# Patient Record
Sex: MALE | Race: ASIAN | Employment: FULL TIME | URBAN - METROPOLITAN AREA
[De-identification: names, ages, dates, MRNs, and addresses within clinical notes are randomized per-mention and may not be internally consistent; named-entity substitution may affect disease eponyms.]

---

## 2020-10-11 ENCOUNTER — IMMUNIZATION (OUTPATIENT)
Dept: NURSING | Facility: CLINIC | Age: 35
End: 2020-10-11
Payer: COMMERCIAL

## 2020-10-11 DIAGNOSIS — Z23 NEED FOR PROPHYLACTIC VACCINATION AND INOCULATION AGAINST INFLUENZA: Primary | ICD-10-CM

## 2020-10-11 PROCEDURE — 90471 IMMUNIZATION ADMIN: CPT

## 2020-10-11 PROCEDURE — 90686 IIV4 VACC NO PRSV 0.5 ML IM: CPT

## 2020-10-11 NOTE — PROGRESS NOTES
Patient consents to receive outdoor care: Yes    Upon arrival, patient instructed to proceed to designated location, place vehicle in park, turn off, and remove keys     If we are unable to safely and ergonomically able to provide care- is the patient able to safely able to get out of car and transfer to a chair? Yes        Patient would like to receive their AVS .

## 2021-11-13 ENCOUNTER — HEALTH MAINTENANCE LETTER (OUTPATIENT)
Age: 36
End: 2021-11-13

## 2022-11-20 ENCOUNTER — HEALTH MAINTENANCE LETTER (OUTPATIENT)
Age: 37
End: 2022-11-20

## 2024-01-28 ENCOUNTER — HEALTH MAINTENANCE LETTER (OUTPATIENT)
Age: 39
End: 2024-01-28

## 2024-12-20 ENCOUNTER — APPOINTMENT (OUTPATIENT)
Dept: CT IMAGING | Facility: CLINIC | Age: 39
End: 2024-12-20
Attending: EMERGENCY MEDICINE
Payer: COMMERCIAL

## 2024-12-20 ENCOUNTER — HOSPITAL ENCOUNTER (EMERGENCY)
Facility: CLINIC | Age: 39
Discharge: HOME | End: 2024-12-20
Attending: EMERGENCY MEDICINE | Admitting: EMERGENCY MEDICINE
Payer: COMMERCIAL

## 2024-12-20 VITALS
WEIGHT: 157.41 LBS | HEART RATE: 122 BPM | TEMPERATURE: 98.8 F | RESPIRATION RATE: 16 BRPM | SYSTOLIC BLOOD PRESSURE: 138 MMHG | OXYGEN SATURATION: 100 % | HEIGHT: 68 IN | DIASTOLIC BLOOD PRESSURE: 98 MMHG | BODY MASS INDEX: 23.86 KG/M2

## 2024-12-20 DIAGNOSIS — K61.0 PERIANAL ABSCESS: ICD-10-CM

## 2024-12-20 LAB
ALBUMIN SERPL BCG-MCNC: 4.4 G/DL (ref 3.5–5.2)
ALP SERPL-CCNC: 97 U/L (ref 40–150)
ALT SERPL W P-5'-P-CCNC: 34 U/L (ref 0–70)
ANION GAP SERPL CALCULATED.3IONS-SCNC: 14 MMOL/L (ref 7–15)
AST SERPL W P-5'-P-CCNC: 17 U/L (ref 0–45)
BASOPHILS # BLD AUTO: 0 10E3/UL (ref 0–0.2)
BASOPHILS NFR BLD AUTO: 0 %
BILIRUB SERPL-MCNC: 1 MG/DL
BUN SERPL-MCNC: 12.3 MG/DL (ref 6–20)
CALCIUM SERPL-MCNC: 9 MG/DL (ref 8.8–10.4)
CHLORIDE SERPL-SCNC: 101 MMOL/L (ref 98–107)
CREAT SERPL-MCNC: 0.96 MG/DL (ref 0.67–1.17)
EGFRCR SERPLBLD CKD-EPI 2021: >90 ML/MIN/1.73M2
EOSINOPHIL # BLD AUTO: 0 10E3/UL (ref 0–0.7)
EOSINOPHIL NFR BLD AUTO: 0 %
ERYTHROCYTE [DISTWIDTH] IN BLOOD BY AUTOMATED COUNT: 11.6 % (ref 10–15)
GLUCOSE SERPL-MCNC: 96 MG/DL (ref 70–99)
HCO3 SERPL-SCNC: 24 MMOL/L (ref 22–29)
HCT VFR BLD AUTO: 45.6 % (ref 40–53)
HGB BLD-MCNC: 15.7 G/DL (ref 13.3–17.7)
HOLD SPECIMEN: NORMAL
HOLD SPECIMEN: NORMAL
IMM GRANULOCYTES # BLD: 0.1 10E3/UL
IMM GRANULOCYTES NFR BLD: 0 %
LACTATE SERPL-SCNC: 1.1 MMOL/L (ref 0.7–2)
LYMPHOCYTES # BLD AUTO: 1 10E3/UL (ref 0.8–5.3)
LYMPHOCYTES NFR BLD AUTO: 7 %
MCH RBC QN AUTO: 30.3 PG (ref 26.5–33)
MCHC RBC AUTO-ENTMCNC: 34.4 G/DL (ref 31.5–36.5)
MCV RBC AUTO: 88 FL (ref 78–100)
MONOCYTES # BLD AUTO: 1 10E3/UL (ref 0–1.3)
MONOCYTES NFR BLD AUTO: 7 %
NEUTROPHILS # BLD AUTO: 12 10E3/UL (ref 1.6–8.3)
NEUTROPHILS NFR BLD AUTO: 85 %
NRBC # BLD AUTO: 0 10E3/UL
NRBC BLD AUTO-RTO: 0 /100
PLATELET # BLD AUTO: 240 10E3/UL (ref 150–450)
POTASSIUM SERPL-SCNC: 3.8 MMOL/L (ref 3.4–5.3)
PROT SERPL-MCNC: 7.7 G/DL (ref 6.4–8.3)
RBC # BLD AUTO: 5.18 10E6/UL (ref 4.4–5.9)
SODIUM SERPL-SCNC: 139 MMOL/L (ref 135–145)
WBC # BLD AUTO: 14 10E3/UL (ref 4–11)

## 2024-12-20 PROCEDURE — 36415 COLL VENOUS BLD VENIPUNCTURE: CPT | Performed by: EMERGENCY MEDICINE

## 2024-12-20 PROCEDURE — 74177 CT ABD & PELVIS W/CONTRAST: CPT

## 2024-12-20 PROCEDURE — 80053 COMPREHEN METABOLIC PANEL: CPT | Performed by: EMERGENCY MEDICINE

## 2024-12-20 PROCEDURE — 84295 ASSAY OF SERUM SODIUM: CPT | Performed by: EMERGENCY MEDICINE

## 2024-12-20 PROCEDURE — 46050 I&D PERIANAL ABSCESS SUPFC: CPT

## 2024-12-20 PROCEDURE — 83605 ASSAY OF LACTIC ACID: CPT | Performed by: EMERGENCY MEDICINE

## 2024-12-20 PROCEDURE — 258N000003 HC RX IP 258 OP 636: Performed by: EMERGENCY MEDICINE

## 2024-12-20 PROCEDURE — 85004 AUTOMATED DIFF WBC COUNT: CPT | Performed by: EMERGENCY MEDICINE

## 2024-12-20 PROCEDURE — 250N000009 HC RX 250: Performed by: EMERGENCY MEDICINE

## 2024-12-20 PROCEDURE — 85041 AUTOMATED RBC COUNT: CPT | Performed by: EMERGENCY MEDICINE

## 2024-12-20 PROCEDURE — 96375 TX/PRO/DX INJ NEW DRUG ADDON: CPT

## 2024-12-20 PROCEDURE — 250N000011 HC RX IP 250 OP 636: Performed by: EMERGENCY MEDICINE

## 2024-12-20 PROCEDURE — 99285 EMERGENCY DEPT VISIT HI MDM: CPT | Mod: 25

## 2024-12-20 PROCEDURE — 87040 BLOOD CULTURE FOR BACTERIA: CPT | Performed by: EMERGENCY MEDICINE

## 2024-12-20 PROCEDURE — 96365 THER/PROPH/DIAG IV INF INIT: CPT | Mod: 59

## 2024-12-20 PROCEDURE — 96361 HYDRATE IV INFUSION ADD-ON: CPT

## 2024-12-20 PROCEDURE — 250N000013 HC RX MED GY IP 250 OP 250 PS 637: Performed by: EMERGENCY MEDICINE

## 2024-12-20 PROCEDURE — 96366 THER/PROPH/DIAG IV INF ADDON: CPT

## 2024-12-20 RX ORDER — ACETAMINOPHEN 500 MG
1000 TABLET ORAL EVERY 4 HOURS PRN
Status: DISCONTINUED | OUTPATIENT
Start: 2024-12-20 | End: 2024-12-20 | Stop reason: HOSPADM

## 2024-12-20 RX ORDER — PIPERACILLIN SODIUM, TAZOBACTAM SODIUM 4; .5 G/20ML; G/20ML
4.5 INJECTION, POWDER, LYOPHILIZED, FOR SOLUTION INTRAVENOUS ONCE
Status: COMPLETED | OUTPATIENT
Start: 2024-12-20 | End: 2024-12-20

## 2024-12-20 RX ORDER — PROPOFOL 10 MG/ML
35 INJECTION, EMULSION INTRAVENOUS ONCE
Status: DISCONTINUED | OUTPATIENT
Start: 2024-12-20 | End: 2024-12-20

## 2024-12-20 RX ORDER — FENTANYL CITRATE 50 UG/ML
100 INJECTION, SOLUTION INTRAMUSCULAR; INTRAVENOUS ONCE
Status: COMPLETED | OUTPATIENT
Start: 2024-12-20 | End: 2024-12-20

## 2024-12-20 RX ORDER — LORAZEPAM 2 MG/ML
1 INJECTION INTRAMUSCULAR ONCE
Status: COMPLETED | OUTPATIENT
Start: 2024-12-20 | End: 2024-12-20

## 2024-12-20 RX ORDER — LIDOCAINE HYDROCHLORIDE AND EPINEPHRINE 10; 10 MG/ML; UG/ML
10 INJECTION, SOLUTION INFILTRATION; PERINEURAL ONCE
Status: DISCONTINUED | OUTPATIENT
Start: 2024-12-20 | End: 2024-12-20 | Stop reason: HOSPADM

## 2024-12-20 RX ORDER — IOPAMIDOL 755 MG/ML
120 INJECTION, SOLUTION INTRAVASCULAR ONCE
Status: COMPLETED | OUTPATIENT
Start: 2024-12-20 | End: 2024-12-20

## 2024-12-20 RX ORDER — HYDROCODONE BITARTRATE AND ACETAMINOPHEN 5; 325 MG/1; MG/1
1 TABLET ORAL EVERY 6 HOURS PRN
Qty: 6 TABLET | Refills: 0 | Status: SHIPPED | OUTPATIENT
Start: 2024-12-20

## 2024-12-20 RX ADMIN — SODIUM CHLORIDE 60 ML: 9 INJECTION, SOLUTION INTRAVENOUS at 18:05

## 2024-12-20 RX ADMIN — LORAZEPAM 1 MG: 2 INJECTION INTRAMUSCULAR; INTRAVENOUS at 18:53

## 2024-12-20 RX ADMIN — IOPAMIDOL 79 ML: 755 INJECTION, SOLUTION INTRAVENOUS at 18:05

## 2024-12-20 RX ADMIN — PIPERACILLIN AND TAZOBACTAM 4.5 G: 4; .5 INJECTION, POWDER, FOR SOLUTION INTRAVENOUS at 16:30

## 2024-12-20 RX ADMIN — SODIUM CHLORIDE 2000 ML: 9 INJECTION, SOLUTION INTRAVENOUS at 17:37

## 2024-12-20 RX ADMIN — ACETAMINOPHEN 1000 MG: 500 TABLET, FILM COATED ORAL at 16:31

## 2024-12-20 RX ADMIN — FENTANYL CITRATE 100 MCG: 50 INJECTION, SOLUTION INTRAMUSCULAR; INTRAVENOUS at 19:38

## 2024-12-20 ASSESSMENT — COLUMBIA-SUICIDE SEVERITY RATING SCALE - C-SSRS
1. IN THE PAST MONTH, HAVE YOU WISHED YOU WERE DEAD OR WISHED YOU COULD GO TO SLEEP AND NOT WAKE UP?: NO
6. HAVE YOU EVER DONE ANYTHING, STARTED TO DO ANYTHING, OR PREPARED TO DO ANYTHING TO END YOUR LIFE?: NO
2. HAVE YOU ACTUALLY HAD ANY THOUGHTS OF KILLING YOURSELF IN THE PAST MONTH?: NO

## 2024-12-20 ASSESSMENT — ACTIVITIES OF DAILY LIVING (ADL)
ADLS_ACUITY_SCORE: 41

## 2024-12-20 NOTE — ED TRIAGE NOTES
"Pressure around anus since Monday. Progressively worse since. Went to [facility] in [place] and sent here for \"septic perianal fistula\".         "

## 2024-12-20 NOTE — ED PROVIDER NOTES
"  Emergency Department Note      History of Present Illness     Chief Complaint   Fever and Fistula    HPI   Jose Antonio Grissom is a 39 year old male who presents to the emergency department for fever and fistula. The patient states that for 3-4 days, he has been experiencing a perianal \"bump\" that has been increasing in size with associated fevers and chills. He endorses a 2/10 \"pressure\" near the perianal area as well. No trauma to the skin prior to symptom onset. No recent swimming or travel. He notes that he has been experiencing decreased bowel movements today but adds that this is due to the pressure around his anus.    Independent Historian   None    Review of External Notes   Reviewed urgent care note from earlier today by Romina Vaughn PA-C. Sent to emergency department for perianal fistula.    Reviewed 06/29/16 office visit note by Dr. Tovar.    Past Medical History     Medical History and Problem List   No past pertinent medical history.    Medications   The patient is currently on no regular medications.    Surgical History   Child circumcision    Physical Exam     Patient Vitals for the past 24 hrs:   BP Temp Temp src Pulse Resp SpO2 Height Weight   12/20/24 1911 -- -- -- -- -- 98 % -- --   12/20/24 1910 -- -- -- -- -- 100 % -- --   12/20/24 1909 -- -- -- -- -- 99 % -- --   12/20/24 1908 -- -- -- -- -- 100 % -- --   12/20/24 1907 -- -- -- -- -- 99 % -- --   12/20/24 1838 -- 98.8  F (37.1  C) -- (!) 122 16 100 % -- --   12/20/24 1834 (!) 138/98 -- -- 111 -- -- -- --   12/20/24 1518 (!) 167/106 (!) 101  F (38.3  C) Oral (!) 138 16 98 % 1.727 m (5' 8\") 71.4 kg (157 lb 6.5 oz)     Physical Exam  General: The patient is alert, in no respiratory distress.    Cardiovascular: Tachycardic with regular rhythm. Good pulses .     Respiratory: Lungs are clear.     Gastrointestinal: Abdomen soft. No guarding,     Musculoskeletal: No gross deformity.     Rectal: With patient placed in the 6 to 9 position with patient " leaning over, there is a large tender fluctuance with no visible drainage.     Skin: No rashes or petechiae.     Neurologic: The patient is alert and oriented         Diagnostics     Lab Results   Labs Ordered and Resulted from Time of ED Arrival to Time of ED Departure   CBC WITH PLATELETS AND DIFFERENTIAL - Abnormal       Result Value    WBC Count 14.0 (*)     RBC Count 5.18      Hemoglobin 15.7      Hematocrit 45.6      MCV 88      MCH 30.3      MCHC 34.4      RDW 11.6      Platelet Count 240      % Neutrophils 85      % Lymphocytes 7      % Monocytes 7      % Eosinophils 0      % Basophils 0      % Immature Granulocytes 0      NRBCs per 100 WBC 0      Absolute Neutrophils 12.0 (*)     Absolute Lymphocytes 1.0      Absolute Monocytes 1.0      Absolute Eosinophils 0.0      Absolute Basophils 0.0      Absolute Immature Granulocytes 0.1      Absolute NRBCs 0.0     COMPREHENSIVE METABOLIC PANEL - Normal    Sodium 139      Potassium 3.8      Carbon Dioxide (CO2) 24      Anion Gap 14      Urea Nitrogen 12.3      Creatinine 0.96      GFR Estimate >90      Calcium 9.0      Chloride 101      Glucose 96      Alkaline Phosphatase 97      AST 17      ALT 34      Protein Total 7.7      Albumin 4.4      Bilirubin Total 1.0     LACTIC ACID WHOLE BLOOD WITH 1X REPEAT IN 2 HR WHEN >2 - Normal    Lactic Acid, Initial 1.1     BLOOD CULTURE   BLOOD CULTURE     Imaging   CT Abdomen Pelvis w Contrast   Final Result   IMPRESSION:       1.  Irregular shaped rim-enhancing fluid collection in the perianal soft tissues slightly to the left of midline consistent with renal abscess. Inflammation does not extend above the pelvic floor muscles.   2.  Diffuse hepatic steatosis.        Independent Interpretation   Reviewed CT abdomen and pelvis, evidence of perianal abscess.    ED Course      Medications Administered   Medications   sodium chloride (PF) 0.9% PF flush 3 mL (has no administration in time range)   sodium chloride (PF) 0.9% PF flush  3 mL (3 mLs Intracatheter Not Given 12/20/24 1732)   acetaminophen (TYLENOL) tablet 1,000 mg (1,000 mg Oral $Given 12/20/24 1631)   sodium chloride 0.9% BOLUS 1,000 mL (has no administration in time range)   lidocaine 1% with EPINEPHrine 1:100,000 injection 10 mL (has no administration in time range)   sodium chloride 0.9% BOLUS 2,000 mL (2,000 mLs Intravenous $New Bag 12/20/24 1737)   piperacillin-tazobactam (ZOSYN) 4.5 g vial to attach to  mL bag (0 g Intravenous Stopped 12/20/24 1838)   iopamidol (ISOVUE-370) solution 120 mL (79 mLs Intravenous $Given 12/20/24 1805)   Saline CT scan flush (60 mLs Intravenous $Given 12/20/24 1805)   LORazepam (ATIVAN) injection 1 mg (1 mg Intravenous $Given 12/20/24 1853)   fentaNYL (PF) (SUBLIMAZE) injection 100 mcg (100 mcg Intravenous $Given 12/20/24 1938)     Procedures   Procedures     Incision and Drainage     Procedure: Incision and Drainage     Consent: Verbal    Indication: Abscess    Location: Perianal    Size: 2 x 3 cm    Ultrasound Guidance: Yes, see separate procedural guidance POCUS note     Preparation: Povidone-iodine     Anesthesia/Sedation: Lidocaine with Epinephrine - 1%     Procedure Detail:    Aspiration: Yes  Incision Type: Stab  Scalpel: 11  Lesion Management: Extensive irrigation, deloculation, or debridement   Wound Management: Packing   Packing: Gauze, 1/4 inch     Patient Status: The patient tolerated the procedure well: Yes. There were no complications.    Discussion of Management   None    ED Course   ED Course as of 12/20/24 2008   Fri Dec 20, 2024   1536 I obtained history and examined the patient as noted above.      1806 I rechecked the patient.      1838 I rechecked the patient and explained findings.     1845 Attempted to obtain consent from patient for sedation, patient prefers local anesthesia.     1938 I rechecked the patient. I performed I&D as noted above in the procedure note. I discussed findings and discharge with the patient. All  questions answered.        Additional Documentation  None    Medical Decision Making / Diagnosis     CMS Diagnoses:   None    MIPS   None    MDM   Jose Antonio Grissom is a 39 year old male who presents with perianal pain, fever, and chills. He has signs of an abscess, revealed on CT abdomen and pelvis. I&D performed and successfully expressed purulent drainage; see procedure note.  Packing was placed.  No signs of more severe infection such as necrotizing or lymphangitis. Wound cares discussed. Follow up in clinic or may return to ED for wound check if cannot arrange. Patient started on Augmentin for risk of infection as well as Norco for pain control. Warning signs for wound given on discharge instructions and verbally.  Patient tolerated procedure well I had offered him sedation but he did request chest pain control and he did well with that.  Large amount of pus was returned the patient's heart rate did come down I do not think he is likely septic and was discharged in good condition.  He was through the IV fluids and Tylenol    Disposition   The patient was discharged.     Diagnosis     ICD-10-CM    1. Perianal abscess  K61.0          Discharge Medications   New Prescriptions    AMOXICILLIN-CLAVULANATE (AUGMENTIN) 875-125 MG TABLET    Take 1 tablet by mouth 2 times daily for 7 days.    HYDROCODONE-ACETAMINOPHEN (NORCO) 5-325 MG TABLET    Take 1 tablet by mouth every 6 hours as needed for pain.     Scribe Disclosure:  MAGNUS, Ephraim Gordon, am serving as a scribe at 3:41 PM on 12/20/2024 to document services personally performed by Stalin Ortiz MD based on my observations and the provider's statements to me.        Stalin Ortiz MD  12/20/24 2126

## 2024-12-23 ENCOUNTER — OFFICE VISIT (OUTPATIENT)
Dept: INTERNAL MEDICINE | Facility: CLINIC | Age: 39
End: 2024-12-23
Payer: COMMERCIAL

## 2024-12-23 VITALS
SYSTOLIC BLOOD PRESSURE: 120 MMHG | RESPIRATION RATE: 24 BRPM | HEART RATE: 101 BPM | WEIGHT: 155.4 LBS | DIASTOLIC BLOOD PRESSURE: 90 MMHG | BODY MASS INDEX: 23.63 KG/M2 | TEMPERATURE: 98.1 F | OXYGEN SATURATION: 97 %

## 2024-12-23 DIAGNOSIS — K61.0 PERIANAL ABSCESS: Primary | ICD-10-CM

## 2024-12-23 LAB
ERYTHROCYTE [DISTWIDTH] IN BLOOD BY AUTOMATED COUNT: 11.2 % (ref 10–15)
HCT VFR BLD AUTO: 42.2 % (ref 40–53)
HGB BLD-MCNC: 14.9 G/DL (ref 13.3–17.7)
MCH RBC QN AUTO: 30 PG (ref 26.5–33)
MCHC RBC AUTO-ENTMCNC: 35.3 G/DL (ref 31.5–36.5)
MCV RBC AUTO: 85 FL (ref 78–100)
PLATELET # BLD AUTO: 258 10E3/UL (ref 150–450)
RBC # BLD AUTO: 4.97 10E6/UL (ref 4.4–5.9)
WBC # BLD AUTO: 5.1 10E3/UL (ref 4–11)

## 2024-12-23 PROCEDURE — 36415 COLL VENOUS BLD VENIPUNCTURE: CPT | Performed by: INTERNAL MEDICINE

## 2024-12-23 PROCEDURE — 85027 COMPLETE CBC AUTOMATED: CPT | Performed by: INTERNAL MEDICINE

## 2024-12-23 PROCEDURE — 99212 OFFICE O/P EST SF 10 MIN: CPT | Performed by: INTERNAL MEDICINE

## 2024-12-23 NOTE — PROGRESS NOTES
Assessment & Plan     Perianal abscess  Resolved after incision and drainage.  Sounds as if packing fell out the following day, does not need to be repacked.  Recheck CBC today.  - CBC with platelets; Future  - CBC with platelets            Subjective   Jose Antonio is a 39 year old, presenting for the following health issues:  ER F/U    HPI       ED/UC Followup:    Facility:  [facility]  Date of visit: 12/20/2024  Reason for visit: Perianal abscess  Current Status: Getting better. There was a line they put in and that fell out yesterday so not sure if that is a good thing would like it looked at. There is still drainage/blood coming out. Replaces gauze every 5 hours. Area still seems hard.       As above.  Follow-up ER visit on 12/20, during which she had perirectal abscess incised and drained.  Was packed during ER visit, sounds as if packing has since fallen out.  He notices occasional serous drainage, but overall feels much better.    Blood cultures drawn during ER visit are negative to date.  He did have slightly elevated white blood cell count.  He continues on antibiotics at present.      Review of Systems  Constitutional, HEENT, cardiovascular, pulmonary, gi and gu systems are negative, except as otherwise noted.      Objective    BP (!) 120/90 (BP Location: Left arm, Patient Position: Sitting, Cuff Size: Adult Regular)   Pulse 101   Temp 98.1  F (36.7  C) (Temporal)   Resp 24   Wt 70.5 kg (155 lb 6.4 oz)   SpO2 97%   BMI 23.63 kg/m    Body mass index is 23.63 kg/m .  Physical Exam   GENERAL: alert and no distress  RECTAL (male): No perirectal mass, no visible drainage present.            Signed Electronically by: Ryan Kaplan MD

## 2024-12-25 LAB
BACTERIA BLD CULT: NO GROWTH
BACTERIA BLD CULT: NO GROWTH

## 2025-02-02 ENCOUNTER — HEALTH MAINTENANCE LETTER (OUTPATIENT)
Age: 40
End: 2025-02-02